# Patient Record
Sex: MALE | Race: WHITE | NOT HISPANIC OR LATINO | URBAN - METROPOLITAN AREA
[De-identification: names, ages, dates, MRNs, and addresses within clinical notes are randomized per-mention and may not be internally consistent; named-entity substitution may affect disease eponyms.]

---

## 2022-04-25 ENCOUNTER — WEB ENCOUNTER (OUTPATIENT)
Dept: URBAN - METROPOLITAN AREA CLINIC 70 | Facility: CLINIC | Age: 42
End: 2022-04-25

## 2022-04-25 ENCOUNTER — DASHBOARD ENCOUNTERS (OUTPATIENT)
Age: 42
End: 2022-04-25

## 2022-04-25 ENCOUNTER — OFFICE VISIT (OUTPATIENT)
Dept: URBAN - METROPOLITAN AREA CLINIC 70 | Facility: CLINIC | Age: 42
End: 2022-04-25
Payer: COMMERCIAL

## 2022-04-25 VITALS
TEMPERATURE: 97.9 F | SYSTOLIC BLOOD PRESSURE: 132 MMHG | DIASTOLIC BLOOD PRESSURE: 90 MMHG | HEART RATE: 118 BPM | WEIGHT: 216 LBS | HEIGHT: 69 IN | BODY MASS INDEX: 31.99 KG/M2

## 2022-04-25 DIAGNOSIS — K64.5 THROMBOSED EXTERNAL HEMORRHOID: ICD-10-CM

## 2022-04-25 DIAGNOSIS — K62.89 RECTAL PAIN: ICD-10-CM

## 2022-04-25 PROCEDURE — 99204 OFFICE O/P NEW MOD 45 MIN: CPT

## 2022-04-25 RX ORDER — PRAMOXINE HYDROCHLORIDE 10 MG/G
APPLY TO AFFECTED AREA(S) ONE TIME DAILY AEROSOL, FOAM TOPICAL
Qty: 15 | Refills: 0 | Status: ACTIVE | COMMUNITY

## 2022-04-25 RX ORDER — TRIAMCINOLONE ACETONIDE 5 MG/G
APPLY EXTERNALLY TWICE DAILY OINTMENT TOPICAL
Qty: 30 | Refills: 0 | Status: ACTIVE | COMMUNITY

## 2022-04-25 NOTE — HPI-TODAY'S VISIT:
Pt presents for rectal pain and rectal bleeding.  He states symptoms have been present for the past 2 weeks.  He states pain is severe and increases with sitting.  He has been using triamcinilone acetonide ointment and promoxine foam with no improvement in symptoms.  Thrombosed external hemorrhoid is noted on exam.  No active BRB, but evidence of previous bleeding from dried blood. Denies prior colonoscopy or fhx of colon cancer.  He denies abdominal pain, constiption, diarrhea, melena, or unintentional weight loss.

## 2022-04-25 NOTE — PHYSICAL EXAM RECTAL:
Thrombosed external hemorrhoids present, no BRB, signs of dried blood,  , no melena, pain voiced with exam.  Chaperone present: Liya Marion MA

## 2022-04-25 NOTE — PHYSICAL EXAM GASTROINTESTINAL
Abdomen , soft, nontender, nondistended , no guarding or rigidity , no masses palpable , normal bowel sounds , Liver and Spleen , no hepatosplenomegaly , liver nontender

## 2022-05-13 ENCOUNTER — OFFICE VISIT (OUTPATIENT)
Dept: URBAN - METROPOLITAN AREA CLINIC 70 | Facility: CLINIC | Age: 42
End: 2022-05-13

## 2022-06-03 ENCOUNTER — OFFICE VISIT (OUTPATIENT)
Dept: URBAN - METROPOLITAN AREA CLINIC 88 | Facility: CLINIC | Age: 42
End: 2022-06-03